# Patient Record
Sex: MALE | Race: WHITE | NOT HISPANIC OR LATINO
[De-identification: names, ages, dates, MRNs, and addresses within clinical notes are randomized per-mention and may not be internally consistent; named-entity substitution may affect disease eponyms.]

---

## 2024-04-15 ENCOUNTER — APPOINTMENT (OUTPATIENT)
Dept: PULMONOLOGY | Facility: CLINIC | Age: 84
End: 2024-04-15
Payer: MEDICARE

## 2024-04-15 VITALS
HEART RATE: 80 BPM | BODY MASS INDEX: 41.98 KG/M2 | DIASTOLIC BLOOD PRESSURE: 82 MMHG | TEMPERATURE: 97.2 F | SYSTOLIC BLOOD PRESSURE: 126 MMHG | WEIGHT: 255 LBS | HEIGHT: 65.5 IN | OXYGEN SATURATION: 95 %

## 2024-04-15 DIAGNOSIS — Z87.891 PERSONAL HISTORY OF NICOTINE DEPENDENCE: ICD-10-CM

## 2024-04-15 DIAGNOSIS — J44.9 CHRONIC OBSTRUCTIVE PULMONARY DISEASE, UNSPECIFIED: ICD-10-CM

## 2024-04-15 DIAGNOSIS — Z86.79 PERSONAL HISTORY OF OTHER DISEASES OF THE CIRCULATORY SYSTEM: ICD-10-CM

## 2024-04-15 PROBLEM — Z00.00 ENCOUNTER FOR PREVENTIVE HEALTH EXAMINATION: Status: ACTIVE | Noted: 2024-04-15

## 2024-04-15 PROCEDURE — 99204 OFFICE O/P NEW MOD 45 MIN: CPT

## 2024-04-15 NOTE — HISTORY OF PRESENT ILLNESS
[Former] : former [Never] : never [TextBox_4] : He is an 83-year-old man who gives a history of COPD.  He was prescribed inhalers in the past but he is not using any at this time.  He complains of dyspnea on exertion however no shortness of breath at rest.  No coughing or wheezing.  He is able to go to the supermarket without experiencing any shortness of breath.  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  He stopped smoking in 1976.  The occupational history was noncontributory.   [YearQuit] : 1976

## 2024-04-15 NOTE — DISCUSSION/SUMMARY
[FreeTextEntry1] : Impression Dyspnea on exertion -Likely multifactorial History of COPD but not on any treatment for Dependent edema noted Elevated BMI Former smoker stopped in 1976  Recommend Pulmonary function test will be obtained Follow-up with his primary care provider regarding the edema Salt restriction suggested Weight loss and exercise if not contraindicated

## 2024-04-15 NOTE — PHYSICAL EXAM
[No Acute Distress] : no acute distress [Normal Oropharynx] : normal oropharynx [Normal Appearance] : normal appearance [No Neck Mass] : no neck mass [Normal Rate/Rhythm] : normal rate/rhythm [Normal S1, S2] : normal s1, s2 [No Resp Distress] : no resp distress [Clear to Auscultation Bilaterally] : clear to auscultation bilaterally [No Abnormalities] : no abnormalities [No HSM] : no hsm [Normal Gait] : normal gait [No Clubbing] : no clubbing [No Cyanosis] : no cyanosis [2+ Pitting] : 2+ pitting [Normal Color/ Pigmentation] : normal color/ pigmentation [No Focal Deficits] : no focal deficits [Oriented x3] : oriented x3 [Normal Affect] : normal affect

## 2024-04-15 NOTE — REVIEW OF SYSTEMS
[Fatigue] : no fatigue [Poor Appetite] : no poor appetite [Sinus Problems] : no sinus problems [Cough] : no cough [Chest Tightness] : no chest tightness [Wheezing] : no wheezing [SOB on Exertion] : sob on exertion [Chest Discomfort] : no chest discomfort [Edema] : edema [Palpitations] : no palpitations [Hay Fever] : no hay fever [GERD] : no gerd [Myalgias] : no myalgias [Rash] : no rash [Anemia] : no anemia [Headache] : no headache [Anxiety] : no anxiety [Diabetes] : no diabetes

## 2024-05-24 ENCOUNTER — APPOINTMENT (OUTPATIENT)
Dept: PULMONOLOGY | Facility: CLINIC | Age: 84
End: 2024-05-24
Payer: MEDICARE

## 2024-05-24 VITALS
OXYGEN SATURATION: 93 % | DIASTOLIC BLOOD PRESSURE: 62 MMHG | SYSTOLIC BLOOD PRESSURE: 104 MMHG | HEART RATE: 88 BPM | TEMPERATURE: 97.9 F

## 2024-05-24 DIAGNOSIS — R06.02 SHORTNESS OF BREATH: ICD-10-CM

## 2024-05-24 PROCEDURE — 94729 DIFFUSING CAPACITY: CPT

## 2024-05-24 PROCEDURE — 99214 OFFICE O/P EST MOD 30 MIN: CPT | Mod: 25

## 2024-05-24 PROCEDURE — 94060 EVALUATION OF WHEEZING: CPT

## 2024-05-24 PROCEDURE — 94727 GAS DIL/WSHOT DETER LNG VOL: CPT

## 2024-05-24 NOTE — PROCEDURE
[FreeTextEntry1] : PFT 5/24/24: No obstruction.  Moderate restriction.  Diffusion was normal.  No significant change after bronchodilator.

## 2024-05-24 NOTE — PHYSICAL EXAM
[No Acute Distress] : no acute distress [Normal Appearance] : normal appearance [Normal Rate/Rhythm] : normal rate/rhythm [Normal S1, S2] : normal s1, s2 [No Resp Distress] : no resp distress [Clear to Auscultation Bilaterally] : clear to auscultation bilaterally [No Abnormalities] : no abnormalities [No HSM] : no hsm [Normal Gait] : normal gait [No Clubbing] : no clubbing [No Cyanosis] : no cyanosis [2+ Pitting] : 2+ pitting [Normal Color/ Pigmentation] : normal color/ pigmentation [No Focal Deficits] : no focal deficits [Oriented x3] : oriented x3 [Normal Affect] : normal affect [No Murmurs] : no murmurs

## 2024-05-24 NOTE — REVIEW OF SYSTEMS
[SOB on Exertion] : sob on exertion [Edema] : edema [Fatigue] : no fatigue [Poor Appetite] : no poor appetite [Postnasal Drip] : no postnasal drip [Sinus Problems] : no sinus problems [Cough] : no cough [Chest Tightness] : no chest tightness [Wheezing] : no wheezing [Chest Discomfort] : no chest discomfort [Palpitations] : no palpitations [Hay Fever] : no hay fever [GERD] : no gerd [Myalgias] : no myalgias [Rash] : no rash [Anemia] : no anemia [Headache] : no headache [Anxiety] : no anxiety [Diabetes] : no diabetes

## 2024-05-24 NOTE — DISCUSSION/SUMMARY
[FreeTextEntry1] : Impression Dyspnea on exertion -Likely multifactorial No evident COPD on PFT Dependent edema noted Elevated BMI Former smoker stopped in 1976  Recommend A chest x-ray was requested Albuterol as needed Weight loss and exercise if not contraindicated Follow-up in 6 months, or sooner if needed

## 2024-05-24 NOTE — HISTORY OF PRESENT ILLNESS
[Former] : former [Never] : never [TextBox_4] : He is an 83-year-old man who gives a history of COPD.  Complains of dyspnea on exertion however no shortness of breath at rest. He stopped smoking in 1976.  The occupational history was noncontributory.  Currently on Advair HFA and albuterol as needed.  No coughing or wheezing. -  [YearQuit] : 1976 [Difficulty Maintaining Sleep] : does not have difficulty maintaining sleep

## 2024-11-01 ENCOUNTER — APPOINTMENT (OUTPATIENT)
Dept: PULMONOLOGY | Facility: CLINIC | Age: 84
End: 2024-11-01

## 2024-11-04 ENCOUNTER — APPOINTMENT (OUTPATIENT)
Dept: PULMONOLOGY | Facility: CLINIC | Age: 84
End: 2024-11-04
Payer: MEDICARE

## 2024-11-04 ENCOUNTER — NON-APPOINTMENT (OUTPATIENT)
Age: 84
End: 2024-11-04

## 2024-11-04 VITALS
WEIGHT: 256 LBS | HEART RATE: 89 BPM | BODY MASS INDEX: 41.95 KG/M2 | TEMPERATURE: 96 F | DIASTOLIC BLOOD PRESSURE: 80 MMHG | OXYGEN SATURATION: 95 % | SYSTOLIC BLOOD PRESSURE: 136 MMHG

## 2024-11-04 DIAGNOSIS — J44.9 CHRONIC OBSTRUCTIVE PULMONARY DISEASE, UNSPECIFIED: ICD-10-CM

## 2024-11-04 DIAGNOSIS — R06.02 SHORTNESS OF BREATH: ICD-10-CM

## 2024-11-04 PROCEDURE — 99214 OFFICE O/P EST MOD 30 MIN: CPT

## 2024-11-04 RX ORDER — ALBUTEROL SULFATE 90 UG/1
108 (90 BASE) INHALANT RESPIRATORY (INHALATION)
Qty: 3 | Refills: 1 | Status: ACTIVE | COMMUNITY
Start: 2024-11-04 | End: 1900-01-01

## 2025-05-05 ENCOUNTER — NON-APPOINTMENT (OUTPATIENT)
Age: 85
End: 2025-05-05

## 2025-05-05 ENCOUNTER — APPOINTMENT (OUTPATIENT)
Dept: PULMONOLOGY | Facility: CLINIC | Age: 85
End: 2025-05-05
Payer: MEDICARE

## 2025-05-05 VITALS
SYSTOLIC BLOOD PRESSURE: 128 MMHG | WEIGHT: 250 LBS | DIASTOLIC BLOOD PRESSURE: 62 MMHG | OXYGEN SATURATION: 95 % | TEMPERATURE: 96.7 F | BODY MASS INDEX: 40.97 KG/M2 | HEART RATE: 101 BPM

## 2025-05-05 DIAGNOSIS — J98.4 OTHER DISORDERS OF LUNG: ICD-10-CM

## 2025-05-05 DIAGNOSIS — R06.02 SHORTNESS OF BREATH: ICD-10-CM

## 2025-05-05 PROCEDURE — 99214 OFFICE O/P EST MOD 30 MIN: CPT

## 2025-05-22 ENCOUNTER — APPOINTMENT (OUTPATIENT)
Dept: ORTHOPEDIC SURGERY | Facility: CLINIC | Age: 85
End: 2025-05-22

## 2025-05-22 VITALS — WEIGHT: 250 LBS | BODY MASS INDEX: 37.03 KG/M2 | HEIGHT: 69 IN

## 2025-05-22 DIAGNOSIS — M16.11 UNILATERAL PRIMARY OSTEOARTHRITIS, RIGHT HIP: ICD-10-CM

## 2025-05-22 DIAGNOSIS — M16.12 UNILATERAL PRIMARY OSTEOARTHRITIS, LEFT HIP: ICD-10-CM

## 2025-05-22 PROCEDURE — 99205 OFFICE O/P NEW HI 60 MIN: CPT

## 2025-05-22 PROCEDURE — 72170 X-RAY EXAM OF PELVIS: CPT

## 2025-05-28 PROBLEM — M16.12 PRIMARY LOCALIZED OSTEOARTHROSIS OF LEFT HIP: Status: ACTIVE | Noted: 2025-05-28

## 2025-05-28 PROBLEM — M16.11 PRIMARY LOCALIZED OSTEOARTHRITIS OF RIGHT HIP: Status: ACTIVE | Noted: 2025-05-28

## 2025-05-29 RX ORDER — DICLOFENAC SODIUM 50 MG/1
50 TABLET, DELAYED RELEASE ORAL
Qty: 28 | Refills: 0 | Status: ACTIVE | COMMUNITY
Start: 2025-05-29 | End: 1900-01-01

## 2025-05-30 ENCOUNTER — NON-APPOINTMENT (OUTPATIENT)
Age: 85
End: 2025-05-30

## 2025-06-10 RX ORDER — DICLOFENAC SODIUM 50 MG/1
50 TABLET, DELAYED RELEASE ORAL
Qty: 28 | Refills: 0 | Status: ACTIVE | COMMUNITY
Start: 2025-06-10 | End: 1900-01-01

## 2025-07-01 ENCOUNTER — EMERGENCY (EMERGENCY)
Facility: HOSPITAL | Age: 85
LOS: 1 days | End: 2025-07-01
Attending: EMERGENCY MEDICINE
Payer: MEDICARE

## 2025-07-01 VITALS
OXYGEN SATURATION: 95 % | DIASTOLIC BLOOD PRESSURE: 73 MMHG | RESPIRATION RATE: 20 BRPM | HEART RATE: 82 BPM | SYSTOLIC BLOOD PRESSURE: 112 MMHG | WEIGHT: 255.07 LBS | HEIGHT: 69 IN | TEMPERATURE: 98 F

## 2025-07-01 VITALS
RESPIRATION RATE: 19 BRPM | TEMPERATURE: 98 F | DIASTOLIC BLOOD PRESSURE: 80 MMHG | SYSTOLIC BLOOD PRESSURE: 128 MMHG | OXYGEN SATURATION: 97 % | HEART RATE: 72 BPM

## 2025-07-01 LAB
ADD ON TEST-SPECIMEN IN LAB: SIGNIFICANT CHANGE UP
ADD ON TEST-SPECIMEN IN LAB: SIGNIFICANT CHANGE UP
ALBUMIN SERPL ELPH-MCNC: 3.9 G/DL — SIGNIFICANT CHANGE UP (ref 3.3–5)
ALP SERPL-CCNC: 80 U/L — SIGNIFICANT CHANGE UP (ref 40–120)
ALT FLD-CCNC: 28 U/L — SIGNIFICANT CHANGE UP (ref 10–45)
ANION GAP SERPL CALC-SCNC: 15 MMOL/L — SIGNIFICANT CHANGE UP (ref 5–17)
APTT BLD: 29.5 SEC — SIGNIFICANT CHANGE UP (ref 26.1–36.8)
AST SERPL-CCNC: 34 U/L — SIGNIFICANT CHANGE UP (ref 10–40)
BASOPHILS # BLD AUTO: 0.07 K/UL — SIGNIFICANT CHANGE UP (ref 0–0.2)
BASOPHILS NFR BLD AUTO: 1 % — SIGNIFICANT CHANGE UP (ref 0–2)
BILIRUB SERPL-MCNC: 1.1 MG/DL — SIGNIFICANT CHANGE UP (ref 0.2–1.2)
BUN SERPL-MCNC: 28 MG/DL — HIGH (ref 7–23)
CALCIUM SERPL-MCNC: 9.7 MG/DL — SIGNIFICANT CHANGE UP (ref 8.4–10.5)
CHLORIDE SERPL-SCNC: 98 MMOL/L — SIGNIFICANT CHANGE UP (ref 96–108)
CO2 SERPL-SCNC: 26 MMOL/L — SIGNIFICANT CHANGE UP (ref 22–31)
CREAT SERPL-MCNC: 0.99 MG/DL — SIGNIFICANT CHANGE UP (ref 0.5–1.3)
EGFR: 75 ML/MIN/1.73M2 — SIGNIFICANT CHANGE UP
EGFR: 75 ML/MIN/1.73M2 — SIGNIFICANT CHANGE UP
EOSINOPHIL # BLD AUTO: 0.09 K/UL — SIGNIFICANT CHANGE UP (ref 0–0.5)
EOSINOPHIL NFR BLD AUTO: 1.2 % — SIGNIFICANT CHANGE UP (ref 0–6)
GLUCOSE SERPL-MCNC: 100 MG/DL — HIGH (ref 70–99)
HCT VFR BLD CALC: 37.3 % — LOW (ref 39–50)
HGB BLD-MCNC: 12.5 G/DL — LOW (ref 13–17)
IMM GRANULOCYTES # BLD AUTO: 0.04 K/UL — SIGNIFICANT CHANGE UP (ref 0–0.07)
IMM GRANULOCYTES NFR BLD AUTO: 0.5 % — SIGNIFICANT CHANGE UP (ref 0–0.9)
INR BLD: 1.11 RATIO — SIGNIFICANT CHANGE UP (ref 0.85–1.16)
LYMPHOCYTES # BLD AUTO: 2.04 K/UL — SIGNIFICANT CHANGE UP (ref 1–3.3)
LYMPHOCYTES NFR BLD AUTO: 28 % — SIGNIFICANT CHANGE UP (ref 13–44)
MCHC RBC-ENTMCNC: 31.6 PG — SIGNIFICANT CHANGE UP (ref 27–34)
MCHC RBC-ENTMCNC: 33.5 G/DL — SIGNIFICANT CHANGE UP (ref 32–36)
MCV RBC AUTO: 94.4 FL — SIGNIFICANT CHANGE UP (ref 80–100)
MONOCYTES # BLD AUTO: 0.97 K/UL — HIGH (ref 0–0.9)
MONOCYTES NFR BLD AUTO: 13.3 % — SIGNIFICANT CHANGE UP (ref 2–14)
NEUTROPHILS # BLD AUTO: 4.07 K/UL — SIGNIFICANT CHANGE UP (ref 1.8–7.4)
NEUTROPHILS NFR BLD AUTO: 56 % — SIGNIFICANT CHANGE UP (ref 43–77)
NRBC # BLD AUTO: 0 K/UL — SIGNIFICANT CHANGE UP (ref 0–0)
NRBC # FLD: 0 K/UL — SIGNIFICANT CHANGE UP (ref 0–0)
NRBC BLD AUTO-RTO: 0 /100 WBCS — SIGNIFICANT CHANGE UP (ref 0–0)
NT-PROBNP SERPL-SCNC: 308 PG/ML — HIGH (ref 0–300)
PLATELET # BLD AUTO: 206 K/UL — SIGNIFICANT CHANGE UP (ref 150–400)
PMV BLD: 9.8 FL — SIGNIFICANT CHANGE UP (ref 7–13)
POTASSIUM SERPL-MCNC: 3.2 MMOL/L — LOW (ref 3.5–5.3)
POTASSIUM SERPL-SCNC: 3.2 MMOL/L — LOW (ref 3.5–5.3)
PROT SERPL-MCNC: 8.2 G/DL — SIGNIFICANT CHANGE UP (ref 6–8.3)
PROTHROM AB SERPL-ACNC: 12.8 SEC — SIGNIFICANT CHANGE UP (ref 9.9–13.4)
RBC # BLD: 3.95 M/UL — LOW (ref 4.2–5.8)
RBC # FLD: 13.3 % — SIGNIFICANT CHANGE UP (ref 10.3–14.5)
SODIUM SERPL-SCNC: 139 MMOL/L — SIGNIFICANT CHANGE UP (ref 135–145)
WBC # BLD: 7.28 K/UL — SIGNIFICANT CHANGE UP (ref 3.8–10.5)
WBC # FLD AUTO: 7.28 K/UL — SIGNIFICANT CHANGE UP (ref 3.8–10.5)

## 2025-07-01 PROCEDURE — 93970 EXTREMITY STUDY: CPT

## 2025-07-01 PROCEDURE — 96374 THER/PROPH/DIAG INJ IV PUSH: CPT

## 2025-07-01 PROCEDURE — 93010 ELECTROCARDIOGRAM REPORT: CPT

## 2025-07-01 PROCEDURE — 73620 X-RAY EXAM OF FOOT: CPT

## 2025-07-01 PROCEDURE — 85025 COMPLETE CBC W/AUTO DIFF WBC: CPT

## 2025-07-01 PROCEDURE — 73590 X-RAY EXAM OF LOWER LEG: CPT

## 2025-07-01 PROCEDURE — 99285 EMERGENCY DEPT VISIT HI MDM: CPT | Mod: 25

## 2025-07-01 PROCEDURE — 84100 ASSAY OF PHOSPHORUS: CPT

## 2025-07-01 PROCEDURE — 85652 RBC SED RATE AUTOMATED: CPT

## 2025-07-01 PROCEDURE — 83880 ASSAY OF NATRIURETIC PEPTIDE: CPT

## 2025-07-01 PROCEDURE — 93970 EXTREMITY STUDY: CPT | Mod: 26

## 2025-07-01 PROCEDURE — 73620 X-RAY EXAM OF FOOT: CPT | Mod: 26,RT

## 2025-07-01 PROCEDURE — 73502 X-RAY EXAM HIP UNI 2-3 VIEWS: CPT | Mod: 26,RT

## 2025-07-01 PROCEDURE — 85610 PROTHROMBIN TIME: CPT

## 2025-07-01 PROCEDURE — 84550 ASSAY OF BLOOD/URIC ACID: CPT

## 2025-07-01 PROCEDURE — 73590 X-RAY EXAM OF LOWER LEG: CPT | Mod: 26,RT

## 2025-07-01 PROCEDURE — 73502 X-RAY EXAM HIP UNI 2-3 VIEWS: CPT

## 2025-07-01 PROCEDURE — 93005 ELECTROCARDIOGRAM TRACING: CPT

## 2025-07-01 PROCEDURE — 85730 THROMBOPLASTIN TIME PARTIAL: CPT

## 2025-07-01 PROCEDURE — 99285 EMERGENCY DEPT VISIT HI MDM: CPT | Mod: FS,GC

## 2025-07-01 PROCEDURE — 80053 COMPREHEN METABOLIC PANEL: CPT

## 2025-07-01 PROCEDURE — 96375 TX/PRO/DX INJ NEW DRUG ADDON: CPT

## 2025-07-01 PROCEDURE — 83735 ASSAY OF MAGNESIUM: CPT

## 2025-07-01 RX ORDER — KETOROLAC TROMETHAMINE 30 MG/ML
15 INJECTION, SOLUTION INTRAMUSCULAR; INTRAVENOUS ONCE
Refills: 0 | Status: DISCONTINUED | OUTPATIENT
Start: 2025-07-01 | End: 2025-07-01

## 2025-07-01 RX ORDER — MAGNESIUM SULFATE 500 MG/ML
2 SYRINGE (ML) INJECTION ONCE
Refills: 0 | Status: COMPLETED | OUTPATIENT
Start: 2025-07-01 | End: 2025-07-01

## 2025-07-01 RX ORDER — OXYCODONE HYDROCHLORIDE 30 MG/1
5 TABLET ORAL ONCE
Refills: 0 | Status: DISCONTINUED | OUTPATIENT
Start: 2025-07-01 | End: 2025-07-01

## 2025-07-01 RX ORDER — ACETAMINOPHEN 500 MG/5ML
1000 LIQUID (ML) ORAL ONCE
Refills: 0 | Status: COMPLETED | OUTPATIENT
Start: 2025-07-01 | End: 2025-07-01

## 2025-07-01 RX ORDER — OXYCODONE HYDROCHLORIDE 30 MG/1
1 TABLET ORAL
Qty: 9 | Refills: 0
Start: 2025-07-01 | End: 2025-07-03

## 2025-07-01 RX ADMIN — KETOROLAC TROMETHAMINE 15 MILLIGRAM(S): 30 INJECTION, SOLUTION INTRAMUSCULAR; INTRAVENOUS at 18:46

## 2025-07-01 RX ADMIN — OXYCODONE HYDROCHLORIDE 5 MILLIGRAM(S): 30 TABLET ORAL at 14:41

## 2025-07-01 RX ADMIN — Medication 40 MILLIEQUIVALENT(S): at 18:50

## 2025-07-01 RX ADMIN — Medication 25 GRAM(S): at 18:46

## 2025-07-01 RX ADMIN — Medication 400 MILLIGRAM(S): at 14:08

## 2025-07-01 RX ADMIN — Medication 40 MILLIEQUIVALENT(S): at 14:40

## 2025-07-01 NOTE — ED PROVIDER NOTE - CARE PROVIDER_API CALL
[facility]  [address] [place] [address]  Phone: [phone]  Fax: [phone]  Established Patient  Follow Up Time: 4-6 Days

## 2025-07-01 NOTE — ED PROVIDER NOTE - ATTENDING CONTRIBUTION TO CARE
84-year-old male w/ pmh of diverticulosis, daily ETOH use (last drink early 06/2025 and no withdrawal hx), ex-smoker, HTN, pre-DM, gout (on allopurinol), right knee severe bone-on-bone osteoarthritis, left hip replacement, and recent TIA (06/2025) who presents with BLE swelling and RLE pain.  Patient was sent in by his PMD for concern for right lower extremity DVT with pain and swelling to the ankle and proximal calf no history of DVT but has a history of gout no obvious skin changes do not believe this cellulitis case but is supposed have surgery to his right hip as well as right knee due to arthritis x-rays were ordered no falls or trauma full range of motion of these joints signed out pending workup analgesia offered

## 2025-07-01 NOTE — ED ADULT NURSE NOTE - OBJECTIVE STATEMENT
83 yo M PMHx HTN, HLD, recent TIA stopped taking plavix yesterday sent by Podiatrist for b/l LE swelling xs 1 week. pt c/o 8-10/10 pain non radiating pain primarily in R hip and RLE.  pt is A&Ox4, MAEW with difficulty to b/l LE able to bear weight but unable to ambulate, b/l LE minimally swollen with no erythema/streaking/signs of infection.  Pt denies headache, dizziness, chest pain, palpitations, cough, SOB, abdominal pain, n/v/d, urinary symptoms, fevers, chills, weakness at this time.

## 2025-07-01 NOTE — ED PROVIDER NOTE - OBJECTIVE STATEMENT
84-year-old male w/ pmh of diverticulosis, daily ETOH use (last drink early 06/2025 and no withdrawal hx), ex-smoker, HTN, pre-DM, gout (on allopurinol), right knee severe bone-on-bone osteoarthritis, left hip replacement, and recent TIA (06/2025) who presents with BLE swelling and RLE pain.    Has been having worsening RLE pain since yesterday, and was seen by PCP (Alfa Griffin) who was concerned for clot and ordered an US (but was never done). Was seen by podiatrist today who was concerned about the BLE swelling and patient was asked to present to ED by PCP. Recent TIA on 06/10/2025 and was started on Plavix. Asked to stop plavix yesterday by neurologist at [facility]. Also was planned to get r. hip replacement on 7/7 with hx of left hip replacement. No recent gout flares but has been on allopurinol. Prior gout flares have been bilateral feet, knees and shoulders. Previous daily hard liquor use, but has not had any drinks since early June without any withdrawal or seizure symptoms. Patient denies any chest pain, palpitations, N/V, diarrhea, constipation, or SOB. He states he was told he may have a "hole in his heart". 84-year-old male w/ pmh of diverticulosis, daily ETOH use (last drink early 06/2025 and no withdrawal hx), ex-smoker, HTN, pre-DM, gout (on allopurinol), right knee severe bone-on-bone osteoarthritis, left hip replacement, and recent TIA (06/2025) who presents with BLE swelling and RLE pain.    Has been having worsening RLE pain since yesterday, and was seen by PCP (Alfa Griffin) who was concerned for clot and ordered an US (but was never done). Was seen by podiatrist today who was concerned about the BLE swelling and patient was asked to present to ED by PCP. Recent TIA on 06/10/2025 and was started on Plavix. Asked to stop plavix yesterday by neurologist at [facility]. Also was planned to get r. hip replacement on 7/7 with hx of left hip replacement. No recent gout flares but has been on allopurinol. Prior gout flares have been bilateral feet, knees and shoulders. Previous daily hard liquor use, but has not had any drinks since early June without any withdrawal or seizure symptoms. Patient denies any chest pain, palpitations, N/V, diarrhea, constipation, or SOB. He was told he may have a "hole in his heart" and is currently on oxy 4 times a day (unknown dose). Patient states he takes losartan, HCTZ, and allopurinol as well (he is unsure of the doses) 84-year-old male w/ pmh of diverticulosis, daily ETOH use (last drink early 06/2025 and no withdrawal hx), ex-smoker, HTN, pre-DM, gout (on allopurinol), right knee severe bone-on-bone osteoarthritis, left hip replacement, and recent TIA (06/2025) who presents with BLE swelling and RLE pain.    Has been having worsening RLE pain since yesterday, and was seen by PCP (Alfa Griffin) who was concerned for clot and ordered an US (but was never done). Was seen by podiatrist today who was concerned about the BLE swelling and patient was asked to present to ED by PCP. Recent TIA on 06/10/2025 and was started on Plavix. Asked to stop plavix yesterday by neurologist at [facility]. Also was planned to get r. hip replacement on 7/7 with hx of left hip replacement. No recent gout flares but has been on allopurinol. Prior gout flares have been bilateral feet, knees and shoulders. Previous daily hard liquor use, but has not had any drinks since early June 2025 without any withdrawal or seizure symptoms. Patient denies any chest pain, palpitations, N/V, diarrhea, constipation, or SOB. He was told he may have a "hole in his heart" and is currently on oxy 4 times a day (unknown dose). Patient states he takes losartan, HCTZ, and allopurinol as well (he is unsure of the doses)

## 2025-07-01 NOTE — ED PROVIDER NOTE - PATIENT PORTAL LINK FT
You can access the FollowMyHealth Patient Portal offered by [facility] by registering at the following website: [email or web address]. By joining [facility]’s FollowMyHealth portal, you will also be able to view your health information using other applications (apps) compatible with our system.

## 2025-07-01 NOTE — ED PROVIDER NOTE - RAPID ASSESSMENT
"[facility]  Hospitalist Discharge Summary      Date of Admission:  6/3/2023  Date of Discharge:  6/3/2023  1:57 PM  Discharging Provider: Minerva Harrington MD  Discharge Service: Hospitalist Service    Same day admit/discharge from ED    Discharge Diagnoses   Non-cardiac chest pain  CKD stage 4  Hypertension   Dyslipidemia  COPD, asthma  Hypothyroidism    Clinically Significant Risk Factors     # Overweight: Estimated body mass index is 29.86 kg/m  as calculated from the following:    Height as of this encounter: 1.676 m (5' 6\").    Weight as of this encounter: 83.9 kg (185 lb).       Follow-ups Needed After Discharge   Follow-up Appointments     Follow-up and recommended labs and tests       Follow-up for a lexiscan stress test in the next week.  Also, an order   has  been placed to expedite your appointment with Cardiology.  Finally,   please schedule an appointment to follow-up with your Primary Provider to   discuss your ER visit.             Unresulted Labs Ordered in the Past 30 Days of this Admission     No orders found from 5/4/2023 to 6/4/2023.      These results will be followed up by myself or my service.    Discharge Disposition   Discharged to home  Condition at discharge: Stable    Hospital Course    Macie Jorge is a 81 year old female with hypertension, dyslipidemia, CKD stage 4, anemia of chronic disease and iron deficiency, hypothyroidism, COPD admitted on 6/3/2023 for chest pain.    Atypical chest pain  Hypertension  Dyslipidemia  Abrupt onset central chest pain at rest (while sleeping), resolved without intervention and pain free since arrival to Emergency Department. Initial troponin negative. Chest xray with R base atelectasis. Normal oxygenation. EKG with sinus rhythm, no ischemia, right axis deviation (not new). Recent outpatient chest CT revealed \"extensive\" coronary calcifications and recent initiation of statin and aspirin, as well as referral to " 84-year-old male previous medical history of hypertension, hyperlipidemia, recent TIA stopped taking Plavix yesterday presents to the emergency department sent in by podiatrist for bilateral leg swelling x 1 week.  Patient states that he has noticed the past week legs have been more swollen lately.  He saw his doctor who sent him to have a test done today.  He does not know what test he was supposed to have done today.  Then this morning he went to see his podiatrist for his toenails and was advised to come to emergency department for evaluation of leg swelling.  Patient states that he does not have any chest pain or difficulty breathing.  Denies any history of having heart failure.  Denies any history of blood clots.  Recently was in [facility] after having a TIA.  Plavix ended yesterday.    Patient was rapidly assessed via a telemedicine and/or role of Quick Triage BILL Santos. A limited history and assessment was performed. The patient will be seen and further evaluated in the main emergency department. The remainder of care and evaluation will be conducted by the primary emergency medicine team. Receiving team will follow up on labs, imaging and serially reassess patient as indicated. All further decisions regarding patient care, evaluation and disposition are at the discretion of the receiving primary emergency department team. cardiology.   -observed in ED while initially waiting for observation bed  -telemetry unremarkable  -serial high sensitivity troponins negative   -discussed labs and symptom course at length  -Lipid panel: HDL 45, total chol 106, LDL 47, triglycerides 71  -continue PTA statin, aspirin, lisinopril   -outpatient cardiology consultation has been initiated so did not pursue in ED observation given complete resolution of symptoms and negative troponins.  -cardiac stress MRI ordered to be done within 1 week as an outpatient  -discharged with prn nitroglycerin SL  -reviewed in detail any reasons to return to the ED for re-evaluation and she voiced understanding. She will have Pedrito () drive her or call an ambulance.     CKD stage 4  Follows with [facility]    COPD, asthma  Prior tobacco abuse (60 pack years). No home oxygen use.   -resume PTA inhaler or formulary equivalent when verified    Chronic, stable conditions:  Anemia of chronic disease and EMERY  Thrombocytopenia, mild  Hypothyroidism  Overactive bladder  Gout  Neuropathic pain    Consultations This Hospital Stay   CARDIOLOGY IP CONSULT DEFERRED    Code Status   Prior    Time Spent on this Encounter   I, Minerva Harrington MD, personally saw the patient today and spent greater than 30 minutes discharging this patient.       Minerva Harrington MD  [facility] EMERGENCY DEPT  [address] [place] [id number]  Phone: [phone]  Fax: [phone]  ______________________________________________________________________    Physical Exam   Vital Signs: Temp: 98.1  F (36.7  C) Temp src: Oral BP: 122/60 Pulse: 71   Resp: 13 SpO2: 95 % O2 Device: None (Room air)    Weight: 185 lbs 0 oz         Primary Care Physician   Elias Meyer    Discharge Orders      Follow-Up with Cardiology      Reason for your hospital stay    You were seen in the ER for chest pain.  Your studies were ultimately normal indicating you are not having a  heart attack.  You do have several risk factors that put you at higher risk for cardiac disease and have known coronary calcifications and so it is recommended you have a repeat stress test.  Orders have been placed for you to have a Lexiscan stress test and for an expedited appointment with Cardiology.  You have also been given a prescription for Nitroglycerin to be used if you have recurrent chest pain.  Please note that it is very important that you continue to listen to your body and return to the ER if you have any additional episodes of chest pain or similar symptoms.  If you develop acute chest pain or symptoms, please call 911.     Follow-up and recommended labs and tests     Follow-up for a lexiscan stress test in the next week.  Also, an order has  been placed to expedite your appointment with Cardiology.  Finally, please schedule an appointment to follow-up with your Primary Provider to discuss your ER visit.     Activity    Your activity upon discharge: activity as tolerated     When to contact your care team    Call your primary doctor if you have any of the following: temperature greater than 101, worsening shortness of breath, increased swelling, worsening or uncontrolled pain, new or unrelenting diarrhea, dizziness/passing out, falls, bleeding that doesn't stop, loss of taste, loss of smell, or any other new or concerning symptoms.  Call 911 or go to the Emergency Room if you need immediate assistance.     Diet    Resume normal diet as tolerated.     NM Lexiscan stress test (nuc card)    Please send results to [facility] Cardiology clinic.       Significant Results and Procedures   Results for orders placed or performed during the hospital encounter of 06/03/23   XR Chest 2 Views    Narrative    EXAM: XR CHEST 2 VIEWS  LOCATION: [facility]  DATE/TIME: 06/03/2023, 7:02 AM CDT    INDICATION: Chest pain.  COMPARISON: Chest CT on 05/15/2023.      Impression    IMPRESSION: PA and  lateral views of the chest were obtained. Cardiomediastinal silhouette is within normal limits. Right basilar pulmonary opacities, could represent atelectasis versus infection. No significant pleural effusion or pneumothorax.           Discharge Medications   Discharge Medication List as of 6/3/2023  1:42 PM      START taking these medications    Details   nitroGLYcerin (NITROSTAT) 0.4 MG sublingual tablet For chest pain place 1 tablet under the tongue every 5 minutes for 3 doses. If symptoms persist 5 minutes after 1st dose call 911., Disp-3 tablet, R-1, Local Print         CONTINUE these medications which have NOT CHANGED    Details   albuterol (PROAIR HFA/PROVENTIL HFA/VENTOLIN HFA) 108 (90 Base) MCG/ACT inhaler INHALE 1 DOSE BY MOUTH EVERY 12 HOURS, Disp-180 g, R-0, MARY, E-Prescribe      aspirin 81 MG EC tablet Take 1 tablet (81 mg) by mouth daily, Disp-90 tablet, R-3, E-Prescribe      Fluticasone-Umeclidin-Vilanterol (TRELEGY ELLIPTA) 200-62.5-25 MCG/ACT oral inhaler Inhale 1 puff into the lungs daily, Disp-28 each, R-1, E-Prescribe      !! gabapentin (NEURONTIN) 100 MG capsule Take 200 mg by mouth every evening, Historical      !! gabapentin (NEURONTIN) 300 MG capsule Take 300 mg by mouth every morning, Historical      HYDROcodone-acetaminophen (NORCO) 7.5-325 MG per tablet Take 1 tablet by mouth 2 times daily (Up to 2 times a day as needed for severe pain), Historical      levothyroxine (SYNTHROID/LEVOTHROID) 137 MCG tablet Take 1 tablet by mouth once daily, Disp-90 tablet, R-0, E-Prescribe      lisinopril (ZESTRIL) 5 MG tablet Take 1 tablet (5 mg) by mouth daily, Disp-90 tablet, R-1, E-Prescribe      oxybutynin ER (DITROPAN XL) 10 MG 24 hr tablet Take 1 tablet by mouth once daily, Disp-90 tablet, R-0, E-Prescribe      rosuvastatin (CRESTOR) 20 MG tablet Take 1 tablet (20 mg) by mouth daily, Disp-90 tablet, R-3, E-Prescribe      blood glucose (NO BRAND SPECIFIED) lancets standard Use to test blood sugar 1  time daily or as directed.Disp-100 each, [id number] to fill per patient insurance      blood glucose (NO BRAND SPECIFIED) test strip Use to test blood sugar 1 time daily or as directed., Disp-100 strip, R-3, E-PrescribeOK to fill per patient insurance      blood glucose monitoring (NO BRAND SPECIFIED) meter device kit Use to test blood sugar 1 time daily or as directed.Disp-1 kit, [id number] to fill per patient insurance       !! - Potential duplicate medications found. Please discuss with provider.        Allergies   Allergies   Allergen Reactions     Morphine Nausea     Severe vomiting     Prednisone      Elevated blood glucose  weakness     Baclofen GI Disturbance     Imitrex [Sumatriptan] Nausea     Tetracycline      Sores in mouth

## 2025-07-01 NOTE — ED PROVIDER NOTE - NSFOLLOWUPINSTRUCTIONS_ED_ALL_ED_FT
You were seen at the emergency room for severe right hip pain with concern for a clot in the deep vein of your right leg. We scanned the veins in your leg with an ultrasound which did not show any blood clots. We gave you some IV tylenol and some oral oxycodone 5mg once which relieved your pain. The xrays of your right foot, below the knee and the right hip did not show any fractures or signs of advanced gout. We spoke with your primary care doctor (Dr. Alfa Griffin) and we believe you need to complete your workup with your neurologist and your cardiologist to get the right hip replacement. Also please followup with your pain management physician to better control your pain. If you start experiencing worsening swelling of your lower extremities, uncontrolled pain, fevers, shortness of breath, chest pain, or nausea/vomiting, please return to the emergency room. You were seen at the emergency room for severe right hip pain with concern for a clot in the deep vein of your right leg. We scanned the veins in your leg with an ultrasound which did not show any blood clots. We gave you some IV tylenol and some oral oxycodone 5mg once which relieved your pain. The xrays of your right foot, below the knee and the right hip did not show any fractures or signs of advanced gout. We spoke with your primary care doctor (Dr. Alfa Griffin) and we believe you need to complete your workup with your neurologist and your cardiologist to get the right hip replacement. Also please followup with your pain management physician to better control your pain. We prescribed you oxycodone 5mg to be taken once every 8 hours as needed for 3 days until you can see a doctor outpatient. If you start experiencing worsening swelling of your lower extremities, uncontrolled pain, fevers, shortness of breath, chest pain, or nausea/vomiting, please return to the emergency room.

## 2025-07-01 NOTE — ED ADULT TRIAGE NOTE - NSTRIAGECARE_GEN_A_ER
normal... Well appearing, well nourished, awake, alert, oriented to person, place, time/situation and in no apparent distress. EKG

## 2025-07-01 NOTE — ED PROVIDER NOTE - PROGRESS NOTE DETAILS
Continued RLE pain, mostly hip and calf. Given IV tylenol and oxy 5mg PO. Ordered xray of RLE to rule out gout vs trauma and will f/u [facility] due to concern for clot. Patient tachypneic in setting of pain, and O2 sat is wnl. ISTOP reviewed and patient has no recent prescriptions for opioids. Potassium repleted. Continued RLE pain, mostly hip and calf. Given IV tylenol and oxy 5mg PO. Ordered xray of RLE to rule out gout vs trauma and will f/u [facility] due to concern for clot. No skin changes or fevers so unlikely cellulitis. Patient tachypneic in setting of pain, and O2 sat is wnl. ISTOP reviewed and patient has no recent prescriptions for opioids. Potassium repleted. EKG NSR. Darci Lopez M.D (Resident): Continued RLE pain, mostly hip and calf. Given IV tylenol and oxy 5mg PO. Ordered xray of RLE to rule out gout vs trauma and will f/u [facility] due to concern for clot. No skin changes or fevers so unlikely cellulitis. Patient tachypneic in setting of pain, and O2 sat is wnl. ISTOP reviewed and patient has no recent prescriptions for opioids. Potassium repleted. EKG NSR. Darci Lopez M.D (Resident): [facility] negative for DVT in BLE. Awaiting xray imaging. Darci Lopez M.D (Resident): [facility] negative for DVT in BLE. Awaiting xray imaging of RLE. Added on uric acid and ESR to prior labs. Darci Lopez M.D (Resident): Xray imaging of RLE negative for fractures or signs of advanced gout. Spoke with patient's PCP (Alfa Griffin) on the phone and went over the chart. Patient is currently awaiting a Right hip replacement for severe degenerative joint disease but is awaiting neuro and cards workup prior to surgery. Considering patient's pain was consistently in the right hip and a DVT was ruled out, patient to followup with PCP outpatient and also with his pain management specialist for better pain control. Spoke with patient and wife by bedside and they are agreeable with the plan. Patient states he can follow up with his PCP tomorrow. Plan to discharge on oxycodone 5mg q8h for 3 days. Darci Lopez M.D (Resident): [facility] negative for DVT in BLE. No tachycardia, no fever, saturating wnl on RA, and tachypnea resolved after pain control. Unlikely PE. Awaiting xray imaging of RLE. Added on uric acid and ESR to prior labs.

## 2025-07-23 ENCOUNTER — APPOINTMENT (OUTPATIENT)
Dept: ORTHOPEDIC SURGERY | Facility: CLINIC | Age: 85
End: 2025-07-23

## 2025-07-23 ENCOUNTER — APPOINTMENT (OUTPATIENT)
Dept: ORTHOPEDIC SURGERY | Facility: CLINIC | Age: 85
End: 2025-07-23
Payer: MEDICARE

## 2025-07-23 VITALS — HEIGHT: 69 IN | BODY MASS INDEX: 37.77 KG/M2 | WEIGHT: 255 LBS

## 2025-07-23 DIAGNOSIS — M16.11 UNILATERAL PRIMARY OSTEOARTHRITIS, RIGHT HIP: ICD-10-CM

## 2025-07-23 PROCEDURE — 99215 OFFICE O/P EST HI 40 MIN: CPT

## 2025-07-24 ENCOUNTER — NON-APPOINTMENT (OUTPATIENT)
Age: 85
End: 2025-07-24

## 2025-07-28 ENCOUNTER — OUTPATIENT (OUTPATIENT)
Dept: OUTPATIENT SERVICES | Facility: HOSPITAL | Age: 85
LOS: 1 days | End: 2025-07-28
Payer: MEDICARE

## 2025-07-28 VITALS
SYSTOLIC BLOOD PRESSURE: 148 MMHG | TEMPERATURE: 98 F | RESPIRATION RATE: 14 BRPM | HEIGHT: 69 IN | HEART RATE: 79 BPM | OXYGEN SATURATION: 96 % | WEIGHT: 233.91 LBS | DIASTOLIC BLOOD PRESSURE: 82 MMHG

## 2025-07-28 DIAGNOSIS — Z92.89 PERSONAL HISTORY OF OTHER MEDICAL TREATMENT: Chronic | ICD-10-CM

## 2025-07-28 DIAGNOSIS — Z96.642 PRESENCE OF LEFT ARTIFICIAL HIP JOINT: Chronic | ICD-10-CM

## 2025-07-28 DIAGNOSIS — M16.11 UNILATERAL PRIMARY OSTEOARTHRITIS, RIGHT HIP: ICD-10-CM

## 2025-07-28 DIAGNOSIS — Z01.818 ENCOUNTER FOR OTHER PREPROCEDURAL EXAMINATION: ICD-10-CM

## 2025-07-28 DIAGNOSIS — Z98.890 OTHER SPECIFIED POSTPROCEDURAL STATES: Chronic | ICD-10-CM

## 2025-07-28 LAB
A1C WITH ESTIMATED AVERAGE GLUCOSE RESULT: 6.4 % — HIGH (ref 4–5.6)
ANION GAP SERPL CALC-SCNC: 15 MMOL/L — SIGNIFICANT CHANGE UP (ref 5–17)
BUN SERPL-MCNC: 20 MG/DL — SIGNIFICANT CHANGE UP (ref 7–23)
CALCIUM SERPL-MCNC: 9.7 MG/DL — SIGNIFICANT CHANGE UP (ref 8.4–10.5)
CHLORIDE SERPL-SCNC: 99 MMOL/L — SIGNIFICANT CHANGE UP (ref 96–108)
CO2 SERPL-SCNC: 27 MMOL/L — SIGNIFICANT CHANGE UP (ref 22–31)
CREAT SERPL-MCNC: 0.83 MG/DL — SIGNIFICANT CHANGE UP (ref 0.5–1.3)
EGFR: 86 ML/MIN/1.73M2 — SIGNIFICANT CHANGE UP
EGFR: 86 ML/MIN/1.73M2 — SIGNIFICANT CHANGE UP
ESTIMATED AVERAGE GLUCOSE: 137 MG/DL — HIGH (ref 68–114)
GLUCOSE SERPL-MCNC: 100 MG/DL — HIGH (ref 70–99)
HCT VFR BLD CALC: 35.7 % — LOW (ref 39–50)
HGB BLD-MCNC: 11.9 G/DL — LOW (ref 13–17)
MCHC RBC-ENTMCNC: 31.3 PG — SIGNIFICANT CHANGE UP (ref 27–34)
MCHC RBC-ENTMCNC: 33.3 G/DL — SIGNIFICANT CHANGE UP (ref 32–36)
MCV RBC AUTO: 93.9 FL — SIGNIFICANT CHANGE UP (ref 80–100)
MRSA PCR RESULT.: SIGNIFICANT CHANGE UP
NRBC # BLD AUTO: 0 K/UL — SIGNIFICANT CHANGE UP (ref 0–0)
NRBC # FLD: 0 K/UL — SIGNIFICANT CHANGE UP (ref 0–0)
NRBC BLD AUTO-RTO: 0 /100 WBCS — SIGNIFICANT CHANGE UP (ref 0–0)
PLATELET # BLD AUTO: 243 K/UL — SIGNIFICANT CHANGE UP (ref 150–400)
PMV BLD: 10.1 FL — SIGNIFICANT CHANGE UP (ref 7–13)
POTASSIUM SERPL-MCNC: 3.5 MMOL/L — SIGNIFICANT CHANGE UP (ref 3.5–5.3)
POTASSIUM SERPL-SCNC: 3.5 MMOL/L — SIGNIFICANT CHANGE UP (ref 3.5–5.3)
RBC # BLD: 3.8 M/UL — LOW (ref 4.2–5.8)
RBC # FLD: 13.9 % — SIGNIFICANT CHANGE UP (ref 10.3–14.5)
S AUREUS DNA NOSE QL NAA+PROBE: SIGNIFICANT CHANGE UP
SODIUM SERPL-SCNC: 141 MMOL/L — SIGNIFICANT CHANGE UP (ref 135–145)
WBC # BLD: 7.48 K/UL — SIGNIFICANT CHANGE UP (ref 3.8–10.5)
WBC # FLD AUTO: 7.48 K/UL — SIGNIFICANT CHANGE UP (ref 3.8–10.5)

## 2025-07-28 PROCEDURE — 87641 MR-STAPH DNA AMP PROBE: CPT

## 2025-07-28 PROCEDURE — 80048 BASIC METABOLIC PNL TOTAL CA: CPT

## 2025-07-28 PROCEDURE — 85027 COMPLETE CBC AUTOMATED: CPT

## 2025-07-28 PROCEDURE — G0463: CPT

## 2025-07-28 PROCEDURE — 87640 STAPH A DNA AMP PROBE: CPT

## 2025-07-28 PROCEDURE — 83036 HEMOGLOBIN GLYCOSYLATED A1C: CPT

## 2025-07-28 PROCEDURE — 36415 COLL VENOUS BLD VENIPUNCTURE: CPT

## 2025-07-28 RX ORDER — TRAMADOL HYDROCHLORIDE 50 MG/1
50 TABLET, FILM COATED ORAL ONCE
Refills: 0 | Status: DISCONTINUED | OUTPATIENT
Start: 2025-08-04 | End: 2025-08-04

## 2025-07-28 RX ORDER — LIDOCAINE HCL/PF 10 MG/ML
0.2 VIAL (ML) INJECTION ONCE
Refills: 0 | Status: DISCONTINUED | OUTPATIENT
Start: 2025-08-04 | End: 2025-08-04

## 2025-08-04 ENCOUNTER — TRANSCRIPTION ENCOUNTER (OUTPATIENT)
Age: 85
End: 2025-08-04

## 2025-08-04 ENCOUNTER — OUTPATIENT (OUTPATIENT)
Dept: INPATIENT UNIT | Facility: HOSPITAL | Age: 85
LOS: 1 days | End: 2025-08-04
Payer: MEDICARE

## 2025-08-04 ENCOUNTER — APPOINTMENT (OUTPATIENT)
Dept: ORTHOPEDIC SURGERY | Facility: HOSPITAL | Age: 85
End: 2025-08-04

## 2025-08-04 VITALS
DIASTOLIC BLOOD PRESSURE: 69 MMHG | WEIGHT: 233.91 LBS | RESPIRATION RATE: 16 BRPM | OXYGEN SATURATION: 95 % | HEART RATE: 86 BPM | SYSTOLIC BLOOD PRESSURE: 132 MMHG | TEMPERATURE: 98 F | HEIGHT: 69 IN

## 2025-08-04 DIAGNOSIS — M16.11 UNILATERAL PRIMARY OSTEOARTHRITIS, RIGHT HIP: ICD-10-CM

## 2025-08-04 DIAGNOSIS — Z92.89 PERSONAL HISTORY OF OTHER MEDICAL TREATMENT: Chronic | ICD-10-CM

## 2025-08-04 DIAGNOSIS — Z96.642 PRESENCE OF LEFT ARTIFICIAL HIP JOINT: Chronic | ICD-10-CM

## 2025-08-04 DIAGNOSIS — Z98.890 OTHER SPECIFIED POSTPROCEDURAL STATES: Chronic | ICD-10-CM

## 2025-08-04 LAB
GLUCOSE BLDC GLUCOMTR-MCNC: 108 MG/DL — HIGH (ref 70–99)
GLUCOSE BLDC GLUCOMTR-MCNC: 125 MG/DL — HIGH (ref 70–99)

## 2025-08-04 PROCEDURE — 72170 X-RAY EXAM OF PELVIS: CPT | Mod: 26

## 2025-08-04 DEVICE — FEM STEM INSIGNIA COLLARED HIGH SZ 8 41X111MM: Type: IMPLANTABLE DEVICE | Site: RIGHT | Status: FUNCTIONAL

## 2025-08-04 DEVICE — LINER ACET TRIDENT X3 0 DEG 36MM D: Type: IMPLANTABLE DEVICE | Site: RIGHT | Status: FUNCTIONAL

## 2025-08-04 DEVICE — SHELL ACET TRIDENT II D 50MM: Type: IMPLANTABLE DEVICE | Site: RIGHT | Status: FUNCTIONAL

## 2025-08-04 DEVICE — HEAD FEM 36MM -5MM: Type: IMPLANTABLE DEVICE | Site: RIGHT | Status: FUNCTIONAL

## 2025-08-04 RX ORDER — TRAMADOL HYDROCHLORIDE 50 MG/1
50 TABLET, FILM COATED ORAL EVERY 6 HOURS
Refills: 0 | Status: DISCONTINUED | OUTPATIENT
Start: 2025-08-04 | End: 2025-08-05

## 2025-08-04 RX ORDER — ACETAMINOPHEN 500 MG/5ML
975 LIQUID (ML) ORAL EVERY 6 HOURS
Refills: 0 | Status: DISCONTINUED | OUTPATIENT
Start: 2025-08-04 | End: 2025-08-04

## 2025-08-04 RX ORDER — CEFAZOLIN SODIUM IN 0.9 % NACL 3 G/100 ML
2000 INTRAVENOUS SOLUTION, PIGGYBACK (ML) INTRAVENOUS EVERY 8 HOURS
Refills: 0 | Status: COMPLETED | OUTPATIENT
Start: 2025-08-04 | End: 2025-08-05

## 2025-08-04 RX ORDER — SENNA 187 MG
2 TABLET ORAL AT BEDTIME
Refills: 0 | Status: DISCONTINUED | OUTPATIENT
Start: 2025-08-04 | End: 2025-08-05

## 2025-08-04 RX ORDER — POLYETHYLENE GLYCOL 3350 17 G/17G
17 POWDER, FOR SOLUTION ORAL AT BEDTIME
Refills: 0 | Status: DISCONTINUED | OUTPATIENT
Start: 2025-08-04 | End: 2025-08-05

## 2025-08-04 RX ORDER — LOSARTAN POTASSIUM 100 MG/1
50 TABLET, FILM COATED ORAL DAILY
Refills: 0 | Status: DISCONTINUED | OUTPATIENT
Start: 2025-08-04 | End: 2025-08-05

## 2025-08-04 RX ORDER — HYDROCHLOROTHIAZIDE 50 MG/1
1 TABLET ORAL
Refills: 0 | DISCHARGE

## 2025-08-04 RX ORDER — OXYCODONE HYDROCHLORIDE 30 MG/1
5 TABLET ORAL EVERY 4 HOURS
Refills: 0 | Status: DISCONTINUED | OUTPATIENT
Start: 2025-08-04 | End: 2025-08-05

## 2025-08-04 RX ORDER — LOSARTAN POTASSIUM 100 MG/1
1 TABLET, FILM COATED ORAL
Refills: 0 | DISCHARGE

## 2025-08-04 RX ORDER — HYDROMORPHONE/SOD CHLOR,ISO/PF 2 MG/10 ML
0.25 SYRINGE (ML) INJECTION ONCE
Refills: 0 | Status: DISCONTINUED | OUTPATIENT
Start: 2025-08-04 | End: 2025-08-04

## 2025-08-04 RX ORDER — ONDANSETRON HCL/PF 4 MG/2 ML
4 VIAL (ML) INJECTION EVERY 6 HOURS
Refills: 0 | Status: DISCONTINUED | OUTPATIENT
Start: 2025-08-04 | End: 2025-08-05

## 2025-08-04 RX ORDER — OXYCODONE HYDROCHLORIDE 30 MG/1
10 TABLET ORAL EVERY 4 HOURS
Refills: 0 | Status: DISCONTINUED | OUTPATIENT
Start: 2025-08-04 | End: 2025-08-05

## 2025-08-04 RX ORDER — CELECOXIB 50 MG/1
200 CAPSULE ORAL EVERY 12 HOURS
Refills: 0 | Status: DISCONTINUED | OUTPATIENT
Start: 2025-08-05 | End: 2025-08-05

## 2025-08-04 RX ORDER — KETOROLAC TROMETHAMINE 30 MG/ML
15 INJECTION, SOLUTION INTRAMUSCULAR; INTRAVENOUS EVERY 6 HOURS
Refills: 0 | Status: COMPLETED | OUTPATIENT
Start: 2025-08-04 | End: 2025-08-05

## 2025-08-04 RX ORDER — ACETAMINOPHEN 500 MG/5ML
1000 LIQUID (ML) ORAL ONCE
Refills: 0 | Status: DISCONTINUED | OUTPATIENT
Start: 2025-08-05 | End: 2025-08-05

## 2025-08-04 RX ORDER — ACETAMINOPHEN 500 MG/5ML
1000 LIQUID (ML) ORAL ONCE
Refills: 0 | Status: COMPLETED | OUTPATIENT
Start: 2025-08-05 | End: 2025-08-05

## 2025-08-04 RX ORDER — DEXAMETHASONE 0.5 MG/1
8 TABLET ORAL ONCE
Refills: 0 | Status: COMPLETED | OUTPATIENT
Start: 2025-08-05 | End: 2025-08-05

## 2025-08-04 RX ORDER — ACETAMINOPHEN 500 MG/5ML
975 LIQUID (ML) ORAL EVERY 8 HOURS
Refills: 0 | Status: DISCONTINUED | OUTPATIENT
Start: 2025-08-06 | End: 2025-08-05

## 2025-08-04 RX ORDER — ASPIRIN 325 MG
81 TABLET ORAL EVERY 12 HOURS
Refills: 0 | Status: DISCONTINUED | OUTPATIENT
Start: 2025-08-04 | End: 2025-08-05

## 2025-08-04 RX ORDER — MAGNESIUM HYDROXIDE 400 MG/5ML
30 SUSPENSION ORAL DAILY
Refills: 0 | Status: DISCONTINUED | OUTPATIENT
Start: 2025-08-04 | End: 2025-08-05

## 2025-08-04 RX ADMIN — TRAMADOL HYDROCHLORIDE 50 MILLIGRAM(S): 50 TABLET, FILM COATED ORAL at 17:27

## 2025-08-04 RX ADMIN — Medication 500 MILLILITER(S): at 20:59

## 2025-08-04 RX ADMIN — Medication 500 MILLILITER(S): at 23:49

## 2025-08-04 RX ADMIN — KETOROLAC TROMETHAMINE 15 MILLIGRAM(S): 30 INJECTION, SOLUTION INTRAMUSCULAR; INTRAVENOUS at 23:49

## 2025-08-05 ENCOUNTER — TRANSCRIPTION ENCOUNTER (OUTPATIENT)
Age: 85
End: 2025-08-05

## 2025-08-05 VITALS
DIASTOLIC BLOOD PRESSURE: 74 MMHG | SYSTOLIC BLOOD PRESSURE: 130 MMHG | TEMPERATURE: 98 F | OXYGEN SATURATION: 94 % | HEART RATE: 86 BPM

## 2025-08-05 DIAGNOSIS — Z29.9 ENCOUNTER FOR PROPHYLACTIC MEASURES, UNSPECIFIED: ICD-10-CM

## 2025-08-05 DIAGNOSIS — I10 ESSENTIAL (PRIMARY) HYPERTENSION: ICD-10-CM

## 2025-08-05 DIAGNOSIS — M10.9 GOUT, UNSPECIFIED: ICD-10-CM

## 2025-08-05 DIAGNOSIS — E78.5 HYPERLIPIDEMIA, UNSPECIFIED: ICD-10-CM

## 2025-08-05 LAB
ANION GAP SERPL CALC-SCNC: 11 MMOL/L — SIGNIFICANT CHANGE UP (ref 5–17)
BUN SERPL-MCNC: 29 MG/DL — HIGH (ref 7–23)
CALCIUM SERPL-MCNC: 8.9 MG/DL — SIGNIFICANT CHANGE UP (ref 8.4–10.5)
CHLORIDE SERPL-SCNC: 101 MMOL/L — SIGNIFICANT CHANGE UP (ref 96–108)
CO2 SERPL-SCNC: 26 MMOL/L — SIGNIFICANT CHANGE UP (ref 22–31)
CREAT SERPL-MCNC: 1 MG/DL — SIGNIFICANT CHANGE UP (ref 0.5–1.3)
EGFR: 74 ML/MIN/1.73M2 — SIGNIFICANT CHANGE UP
EGFR: 74 ML/MIN/1.73M2 — SIGNIFICANT CHANGE UP
GLUCOSE SERPL-MCNC: 141 MG/DL — HIGH (ref 70–99)
HCT VFR BLD CALC: 31.3 % — LOW (ref 39–50)
HGB BLD-MCNC: 10.3 G/DL — LOW (ref 13–17)
MCHC RBC-ENTMCNC: 30.9 PG — SIGNIFICANT CHANGE UP (ref 27–34)
MCHC RBC-ENTMCNC: 32.9 G/DL — SIGNIFICANT CHANGE UP (ref 32–36)
MCV RBC AUTO: 94 FL — SIGNIFICANT CHANGE UP (ref 80–100)
NRBC # BLD AUTO: 0 K/UL — SIGNIFICANT CHANGE UP (ref 0–0)
NRBC # FLD: 0 K/UL — SIGNIFICANT CHANGE UP (ref 0–0)
NRBC BLD AUTO-RTO: 0 /100 WBCS — SIGNIFICANT CHANGE UP (ref 0–0)
PLATELET # BLD AUTO: 207 K/UL — SIGNIFICANT CHANGE UP (ref 150–400)
PMV BLD: 10 FL — SIGNIFICANT CHANGE UP (ref 7–13)
POTASSIUM SERPL-MCNC: 4.1 MMOL/L — SIGNIFICANT CHANGE UP (ref 3.5–5.3)
POTASSIUM SERPL-SCNC: 4.1 MMOL/L — SIGNIFICANT CHANGE UP (ref 3.5–5.3)
RBC # BLD: 3.33 M/UL — LOW (ref 4.2–5.8)
RBC # FLD: 13.7 % — SIGNIFICANT CHANGE UP (ref 10.3–14.5)
SODIUM SERPL-SCNC: 138 MMOL/L — SIGNIFICANT CHANGE UP (ref 135–145)
WBC # BLD: 9.75 K/UL — SIGNIFICANT CHANGE UP (ref 3.8–10.5)
WBC # FLD AUTO: 9.75 K/UL — SIGNIFICANT CHANGE UP (ref 3.8–10.5)

## 2025-08-05 PROCEDURE — 36415 COLL VENOUS BLD VENIPUNCTURE: CPT

## 2025-08-05 PROCEDURE — C1776: CPT

## 2025-08-05 PROCEDURE — 97161 PT EVAL LOW COMPLEX 20 MIN: CPT

## 2025-08-05 PROCEDURE — 76000 FLUOROSCOPY <1 HR PHYS/QHP: CPT

## 2025-08-05 PROCEDURE — 97165 OT EVAL LOW COMPLEX 30 MIN: CPT

## 2025-08-05 PROCEDURE — 82962 GLUCOSE BLOOD TEST: CPT

## 2025-08-05 PROCEDURE — 27130 TOTAL HIP ARTHROPLASTY: CPT | Mod: RT

## 2025-08-05 PROCEDURE — 85027 COMPLETE CBC AUTOMATED: CPT

## 2025-08-05 PROCEDURE — 80048 BASIC METABOLIC PNL TOTAL CA: CPT

## 2025-08-05 PROCEDURE — 72170 X-RAY EXAM OF PELVIS: CPT

## 2025-08-05 RX ORDER — POLYETHYLENE GLYCOL 3350 17 G/17G
17 POWDER, FOR SOLUTION ORAL
Qty: 0 | Refills: 0 | DISCHARGE
Start: 2025-08-05

## 2025-08-05 RX ORDER — ASPIRIN 325 MG
1 TABLET ORAL
Qty: 0 | Refills: 0 | DISCHARGE
Start: 2025-08-05

## 2025-08-05 RX ORDER — SENNA 187 MG
2 TABLET ORAL
Qty: 0 | Refills: 0 | DISCHARGE
Start: 2025-08-05

## 2025-08-05 RX ORDER — RABEPRAZOLE SODIUM 20 MG/1
1 TABLET, DELAYED RELEASE ORAL
Qty: 0 | Refills: 0 | DISCHARGE

## 2025-08-05 RX ORDER — TRAMADOL HYDROCHLORIDE 50 MG/1
1 TABLET, FILM COATED ORAL
Qty: 28 | Refills: 0
Start: 2025-08-05 | End: 2025-08-11

## 2025-08-05 RX ORDER — CELECOXIB 50 MG/1
1 CAPSULE ORAL
Qty: 28 | Refills: 0
Start: 2025-08-05 | End: 2025-08-18

## 2025-08-05 RX ORDER — ACETAMINOPHEN 500 MG/5ML
3 LIQUID (ML) ORAL
Qty: 0 | Refills: 0 | DISCHARGE
Start: 2025-08-05

## 2025-08-05 RX ORDER — OXYCODONE HYDROCHLORIDE AND ACETAMINOPHEN 10; 325 MG/1; MG/1
1 TABLET ORAL
Refills: 0 | DISCHARGE

## 2025-08-05 RX ORDER — OMEPRAZOLE 20 MG/1
1 CAPSULE, DELAYED RELEASE ORAL
Qty: 43 | Refills: 0
Start: 2025-08-05 | End: 2025-09-16

## 2025-08-05 RX ORDER — ASPIRIN 325 MG
0 TABLET ORAL
Refills: 0 | DISCHARGE

## 2025-08-05 RX ADMIN — Medication 400 MILLIGRAM(S): at 01:42

## 2025-08-05 RX ADMIN — KETOROLAC TROMETHAMINE 15 MILLIGRAM(S): 30 INJECTION, SOLUTION INTRAMUSCULAR; INTRAVENOUS at 00:30

## 2025-08-05 RX ADMIN — Medication 100 MILLIGRAM(S): at 01:43

## 2025-08-05 RX ADMIN — Medication 40 MILLIGRAM(S): at 05:36

## 2025-08-05 RX ADMIN — Medication 100 MILLIGRAM(S): at 05:36

## 2025-08-05 RX ADMIN — KETOROLAC TROMETHAMINE 15 MILLIGRAM(S): 30 INJECTION, SOLUTION INTRAMUSCULAR; INTRAVENOUS at 06:02

## 2025-08-05 RX ADMIN — DEXAMETHASONE 101.6 MILLIGRAM(S): 0.5 TABLET ORAL at 05:36

## 2025-08-05 RX ADMIN — Medication 1000 MILLIGRAM(S): at 11:39

## 2025-08-05 RX ADMIN — Medication 1000 MILLIGRAM(S): at 02:56

## 2025-08-05 RX ADMIN — Medication 81 MILLIGRAM(S): at 05:36

## 2025-08-05 RX ADMIN — KETOROLAC TROMETHAMINE 15 MILLIGRAM(S): 30 INJECTION, SOLUTION INTRAMUSCULAR; INTRAVENOUS at 12:59

## 2025-08-05 RX ADMIN — Medication 400 MILLIGRAM(S): at 10:42

## 2025-08-05 RX ADMIN — Medication 500 MILLILITER(S): at 05:35

## 2025-08-05 RX ADMIN — KETOROLAC TROMETHAMINE 15 MILLIGRAM(S): 30 INJECTION, SOLUTION INTRAMUSCULAR; INTRAVENOUS at 05:35

## 2025-08-05 RX ADMIN — Medication 100 MILLIGRAM(S): at 09:54

## 2025-08-05 RX ADMIN — KETOROLAC TROMETHAMINE 15 MILLIGRAM(S): 30 INJECTION, SOLUTION INTRAMUSCULAR; INTRAVENOUS at 11:59

## 2025-08-05 RX ADMIN — LOSARTAN POTASSIUM 50 MILLIGRAM(S): 100 TABLET, FILM COATED ORAL at 05:36

## 2025-08-11 ENCOUNTER — NON-APPOINTMENT (OUTPATIENT)
Age: 85
End: 2025-08-11

## 2025-08-19 ENCOUNTER — APPOINTMENT (OUTPATIENT)
Dept: ORTHOPEDIC SURGERY | Facility: CLINIC | Age: 85
End: 2025-08-19
Payer: MEDICARE

## 2025-08-19 DIAGNOSIS — Z96.641 PRESENCE OF RIGHT ARTIFICIAL HIP JOINT: ICD-10-CM

## 2025-08-19 PROCEDURE — 99024 POSTOP FOLLOW-UP VISIT: CPT

## 2025-08-19 PROCEDURE — 73502 X-RAY EXAM HIP UNI 2-3 VIEWS: CPT | Mod: 26,RT

## 2025-08-25 RX ORDER — CELECOXIB 100 MG/1
100 CAPSULE ORAL TWICE DAILY
Qty: 30 | Refills: 0 | Status: ACTIVE | COMMUNITY
Start: 2025-08-25 | End: 1900-01-01

## 2025-08-26 PROBLEM — M10.9 GOUT, UNSPECIFIED: Chronic | Status: ACTIVE | Noted: 2025-07-28

## 2025-08-26 PROBLEM — K21.9 GASTRO-ESOPHAGEAL REFLUX DISEASE WITHOUT ESOPHAGITIS: Chronic | Status: ACTIVE | Noted: 2025-07-28

## 2025-08-26 PROBLEM — E78.5 HYPERLIPIDEMIA, UNSPECIFIED: Chronic | Status: ACTIVE | Noted: 2025-07-28

## 2025-08-26 PROBLEM — I10 ESSENTIAL (PRIMARY) HYPERTENSION: Chronic | Status: ACTIVE | Noted: 2025-07-28

## (undated) DEVICE — DRSG AQUACEL 3.5 X 6"

## (undated) DEVICE — PACK TOTAL HIP (2 PACKS)

## (undated) DEVICE — SOL INJ NS 0.9% 500ML 1-PORT

## (undated) DEVICE — SUT POLYSORB 2-0 30" GS-21 UNDYED

## (undated) DEVICE — SYR LUER LOK 20CC

## (undated) DEVICE — SOL IRR POUR H2O 1000ML

## (undated) DEVICE — LINER TRACTION BOOT MIZUHO

## (undated) DEVICE — DRSG DERMABOND 0.7ML

## (undated) DEVICE — SAW BLADE STRYKER SAGITTAL 3 HOLE OSCILLATING

## (undated) DEVICE — WOUND IRR IRRISEPT W 0.5 CHG

## (undated) DEVICE — HOOD T7 NON-PEELAWAY

## (undated) DEVICE — SUT QUILL PDO 1 30CM T9 DA

## (undated) DEVICE — Device

## (undated) DEVICE — SUT POLYSORB 0 30" GS-21 UNDYED

## (undated) DEVICE — SUT QUILL MONODERM 2-0 3/8 CIRCLE 45CM

## (undated) DEVICE — SPECIMEN CONTAINER 100ML

## (undated) DEVICE — VENODYNE/SCD SLEEVE CALF MEDIUM

## (undated) DEVICE — SUT POLYSORB 1-0 36" GS-21 UNDYED

## (undated) DEVICE — DRAPE C ARM UNIVERSAL

## (undated) DEVICE — NDL HYPO SAFE 22G X 1.5" (BLACK)

## (undated) DEVICE — SOL IRR POUR NS 0.9% 500ML

## (undated) DEVICE — DRAPE 3/4 SHEET W REINFORCEMENT 56X77"

## (undated) DEVICE — ELCTR PLASMA BLADE X 3.0S WIDE TIP

## (undated) DEVICE — DRAPE SHOWER CURTAIN ISOLATION

## (undated) DEVICE — ELCTR AQUAMANTYS BIPOLAR SEALER 6.0

## (undated) DEVICE — TUBING SUCTION 20FT

## (undated) DEVICE — POSITIONER FOAM HEADREST (PINK)

## (undated) DEVICE — WARMING BLANKET UPPER ADULT

## (undated) DEVICE — POSITIONER FOAM EGG CRATE ULNAR 2PCS (PINK)